# Patient Record
Sex: MALE | Race: WHITE
[De-identification: names, ages, dates, MRNs, and addresses within clinical notes are randomized per-mention and may not be internally consistent; named-entity substitution may affect disease eponyms.]

---

## 2017-10-07 ENCOUNTER — HOSPITAL ENCOUNTER (EMERGENCY)
Dept: HOSPITAL 53 - M ED | Age: 72
Discharge: HOME | End: 2017-10-07
Payer: MEDICARE

## 2017-10-07 VITALS — SYSTOLIC BLOOD PRESSURE: 138 MMHG | DIASTOLIC BLOOD PRESSURE: 102 MMHG

## 2017-10-07 DIAGNOSIS — F03.90: ICD-10-CM

## 2017-10-07 DIAGNOSIS — R55: Primary | ICD-10-CM

## 2017-10-07 LAB
ANION GAP SERPL CALC-SCNC: 9 MEQ/L (ref 8–16)
BASE EXCESS BLDV CALC-SCNC: 2.4 MMOL/L (ref -2–2)
BASOPHILS # BLD AUTO: 0 10^3/UL (ref 0–0.2)
BASOPHILS NFR BLD AUTO: 0.4 % (ref 0–1)
BUN SERPL-MCNC: 16 MG/DL (ref 7–18)
CALCIUM SERPL-MCNC: 8.7 MG/DL (ref 8.8–10.2)
CHLORIDE SERPL-SCNC: 103 MEQ/L (ref 98–107)
CO2 BLDV CALC-SCNC: 29.2 MEQ/L (ref 24–28)
CO2 SERPL-SCNC: 27 MEQ/L (ref 21–32)
CREAT SERPL-MCNC: 1.04 MG/DL (ref 0.7–1.3)
EOSINOPHIL # BLD AUTO: 0.1 10^3/UL (ref 0–0.5)
EOSINOPHIL NFR BLD AUTO: 1.8 % (ref 0–3)
ERYTHROCYTE [DISTWIDTH] IN BLOOD BY AUTOMATED COUNT: 12.1 % (ref 11.5–14.5)
GFR SERPL CREATININE-BSD FRML MDRD: > 60 ML/MIN/{1.73_M2} (ref 42–?)
GLUCOSE SERPL-MCNC: 110 MG/DL (ref 83–110)
HCO3 STD BLDV-SCNC: 26.4 MEQ/L
IMM GRANULOCYTES NFR BLD: 0.5 % (ref 0–0)
INR PPP: 1.1
LYMPHOCYTES # BLD AUTO: 1.3 10^3/UL (ref 1.5–4.5)
LYMPHOCYTES NFR BLD AUTO: 17.1 % (ref 24–44)
MCH RBC QN AUTO: 32.4 PG (ref 27–33)
MCHC RBC AUTO-ENTMCNC: 34.8 G/DL (ref 32–36.5)
MCV RBC AUTO: 93 FL (ref 80–96)
MONOCYTES # BLD AUTO: 0.6 10^3/UL (ref 0–0.8)
MONOCYTES NFR BLD AUTO: 7.8 % (ref 0–5)
NEUTROPHILS # BLD AUTO: 5.4 10^3/UL (ref 1.8–7.7)
NEUTROPHILS NFR BLD AUTO: 72.4 % (ref 36–66)
NRBC BLD AUTO-RTO: 0 % (ref 0–0)
PCO2 BLDV: 45.5 MMHG (ref 38–50)
PLATELET # BLD AUTO: 213 10^3/UL (ref 150–450)
PO2 BLDV: 55.1 MMHG (ref 30–50)
POTASSIUM SERPL-SCNC: 3.9 MEQ/L (ref 3.5–5.1)
SAO2 % BLDV: 90.5 % (ref 60–80)
SODIUM SERPL-SCNC: 139 MEQ/L (ref 136–145)
WBC # BLD AUTO: 7.4 10^3/UL (ref 4–10)

## 2017-10-07 NOTE — REPUSA
CT of the head

Clinical history: syncope.

Protocol: Multiple axial CT images obtained with 5 mm slice thickness were obtained through the head 
without administration of contrast.

Findings: The ventricles and sulci are symmetric but prominent in size bilaterally. There are periven
tricular areas of low attenuation throughout the deep white matter. There is no evidence of acute hem
orrhage or infarct. There is no midline shift, mass effect, or extra-axial fluid collection. The osse
ous structures are unremarkable. The visualized paranasal sinuses and mastoid air cells are clear.

Impression: No acute hemorrhage or infarct. Findings are consistent with age-related atrophy and 
tommy small vessel ischemic disease.

     Electronically signed by ARIANA SORENSEN MD on 10/07/2017 09:21:03 PM ET

## 2017-10-08 NOTE — ECGEPIP
Stationary ECG Study

                           [facility] - ED

                                       

                                       Test Date:    2017-10-07

Pat Name:     ALDA BALLARD         Department:   

Patient ID:   [id number]                 Room:         -

Gender:       M                        Technician:   avis

:          1945               Requested By: ROGER SMITH

Order Number: [id number]     Reading MD:   Emma Stockton

                                 Measurements

Intervals                              Axis          

Rate:         69                       P:            34

KY:           162                      QRS:          -16

QRSD:         89                       T:            75

QT:           387                                    

QTc:          415                                    

                           Interpretive Statements

SINUS RHYTHM

NONSPECIFIC T-WAVE ABNORMALITY

NO PRIOR FOR COMPARISON

Electronically Signed On 10-8-2017 12:41:27 EDT by Emma Stockton

## 2017-10-08 NOTE — REP
REASON:   Syncope.

 

COMPARISON:  Frontal view obtained as part of a rib series 02/15/2015.

 

The technique utilized in obtaining the radiograph has magnified the cardiac

silhouette and accentuated the interstitial markings.

 

There is no change from the prior exam. The lung fields are hypo-expanded. There

are no acute patchy parenchymal opacities or pleural effusions. The

cardiomediastinal silhouette is stable.  Cardiomegaly is again noted.  There is

no change in the osseous structures.

 

IMPRESSION:

 

Chronic changes.

 

 

Signed by

Abiel Horowitz DO 10/08/2017 10:19 A

## 2019-09-16 ENCOUNTER — HOSPITAL ENCOUNTER (OUTPATIENT)
Dept: HOSPITAL 53 - M LAB REF | Age: 74
End: 2019-09-16
Attending: INTERNAL MEDICINE
Payer: MEDICARE

## 2019-09-16 DIAGNOSIS — F02.81: ICD-10-CM

## 2019-09-16 DIAGNOSIS — G30.0: Primary | ICD-10-CM

## 2019-09-16 LAB
FOLATE SERPL-MCNC: (no result) NG/ML
VIT B12 SERPL-MCNC: 509 PG/ML